# Patient Record
Sex: MALE | Race: WHITE | HISPANIC OR LATINO | URBAN - METROPOLITAN AREA
[De-identification: names, ages, dates, MRNs, and addresses within clinical notes are randomized per-mention and may not be internally consistent; named-entity substitution may affect disease eponyms.]

---

## 2020-03-30 ENCOUNTER — HOSPITAL ENCOUNTER (EMERGENCY)
Facility: MEDICAL CENTER | Age: 20
End: 2020-03-31
Attending: EMERGENCY MEDICINE

## 2020-03-30 DIAGNOSIS — F41.9 ANXIETY: ICD-10-CM

## 2020-03-30 PROCEDURE — 99283 EMERGENCY DEPT VISIT LOW MDM: CPT

## 2020-03-30 RX ORDER — HYDROXYZINE HYDROCHLORIDE 25 MG/1
25 TABLET, FILM COATED ORAL ONCE
Status: COMPLETED | OUTPATIENT
Start: 2020-03-31 | End: 2020-03-31

## 2020-03-31 VITALS
RESPIRATION RATE: 15 BRPM | OXYGEN SATURATION: 100 % | HEIGHT: 72 IN | TEMPERATURE: 98.4 F | BODY MASS INDEX: 25.08 KG/M2 | DIASTOLIC BLOOD PRESSURE: 76 MMHG | WEIGHT: 185.19 LBS | HEART RATE: 75 BPM | SYSTOLIC BLOOD PRESSURE: 134 MMHG

## 2020-03-31 PROCEDURE — 700102 HCHG RX REV CODE 250 W/ 637 OVERRIDE(OP): Performed by: EMERGENCY MEDICINE

## 2020-03-31 PROCEDURE — A9270 NON-COVERED ITEM OR SERVICE: HCPCS | Performed by: EMERGENCY MEDICINE

## 2020-03-31 RX ADMIN — HYDROXYZINE HYDROCHLORIDE 25 MG: 25 TABLET, FILM COATED ORAL at 00:02

## 2020-03-31 ASSESSMENT — ENCOUNTER SYMPTOMS
BLURRED VISION: 0
FEVER: 0
HEADACHES: 0
NAUSEA: 1
PALPITATIONS: 0
TINGLING: 1
VOMITING: 0
DOUBLE VISION: 0
DIARRHEA: 0
ABDOMINAL PAIN: 0
CHILLS: 0
NERVOUS/ANXIOUS: 1
DIZZINESS: 0

## 2020-03-31 NOTE — ED TRIAGE NOTES
"Chief Complaint   Patient presents with   • Tremors     Abnormal sensation after exercise, \"shaky\" no previous occurance prior to this   • Nausea     "

## 2020-03-31 NOTE — ED PROVIDER NOTES
"ED Provider Note    CHIEF COMPLAINT  Chief Complaint   Patient presents with   • Tremors     Abnormal sensation after exercise, \"shaky\" no previous occurance prior to this   • Nausea       HPI  Malachi Reyes is a 20 y.o. male with a sensation of \"shakiness.\"  Patient reports that he began working out around 8 PM this evening and finished around 10 PM.  Shortly after finishing working out he felt very shaky and anxious.  He stated that he did not feel like he could sit down because he was so shaky.  He reports that while this was happening he was breathing hard and feeling numbness in his face and fingertips.  He also has associated nausea.  Patient reports that he is feeling slightly better than he did initially at the time of my evaluation.  He is concerned about his \"shakiness.\"  Patient reports that immediately prior to working out he did not take any supplements, but he did drink coffee.  He ate dinner as usual as well.  Patient states that he was drinking water during the workout, and does not feel that he is dehydrated.  He does note that he has had recent stressors including breaking up with his girlfriend.    Patient also reports that he has a history of frequent marijuana use, last use was 2 days ago.    REVIEW OF SYSTEMS  Review of Systems   Constitutional: Negative for chills and fever.   Eyes: Negative for blurred vision and double vision.   Cardiovascular: Negative for chest pain and palpitations.   Gastrointestinal: Positive for nausea. Negative for abdominal pain, diarrhea and vomiting.   Neurological: Positive for tingling. Negative for dizziness and headaches.   Psychiatric/Behavioral: The patient is nervous/anxious.    All other systems reviewed and are negative.      PAST MEDICAL HISTORY       SOCIAL HISTORY  Social History     Tobacco Use   • Smoking status: Not on file   Substance and Sexual Activity   • Alcohol use: Not on file   • Drug use: Not on file   • Sexual activity: Not on file "       SURGICAL HISTORY  patient denies any surgical history    CURRENT MEDICATIONS  Home Medications     Reviewed by Rogelio Vincent R.N. (Registered Nurse) on 03/30/20 at 2331  Med List Status: Partial   Medication Last Dose Status        Patient Todd Taking any Medications                       ALLERGIES  No Known Allergies    PHYSICAL EXAM  VITAL SIGNS: /99   Pulse 79   Temp 36.9 °C (98.4 °F) (Oral)   Resp 16   Ht 1.829 m (6')   Wt 84 kg (185 lb 3 oz)   SpO2 100%   BMI 25.12 kg/m²  @ADELIA[[id number]::@  Pulse ox interpretation: I interpret this pulse ox as normal.    Physical Exam   Constitutional: He is oriented to person, place, and time. He appears distressed.   Appears anxious   HENT:   Head: Normocephalic and atraumatic.   Mouth/Throat: Mucous membranes are dry.   Eyes: Pupils are equal, round, and reactive to light. Conjunctivae are normal.   Neck: Normal range of motion. Neck supple.   Cardiovascular: Normal rate, regular rhythm and intact distal pulses.   Pulmonary/Chest: Effort normal and breath sounds normal. No respiratory distress. He has no rales.   Abdominal: Soft. Bowel sounds are normal. He exhibits no distension. There is no abdominal tenderness.   Musculoskeletal: Normal range of motion.         General: No deformity or edema.   Neurological: He is alert and oriented to person, place, and time. GCS score is 15.   Skin: Skin is warm.   Psychiatric: Judgment normal. His mood appears anxious.   Nursing note and vitals reviewed.          COURSE & MEDICAL DECISION MAKING  Pertinent Labs & Imaging studies reviewed. (See chart for details)  20-year-old male presents emergency department for evaluation of shakiness and nausea.  On examination, the patient was well-appearing with normal vital signs aside from hypertension for age.  Patient's presentation is most consistent with anxiety and a panic attack.  Feel that all of his symptoms are secondary to this.  Patient also drink coffee immediately  "before working out and felt the shakiness right after.  Feel that his anxiety is likely exacerbated by the caffeine.    I had a long discussion with the patient regarding anxiety, side effects of caffeine, as well as marijuana use.  Patient does have multiple recent stressors, but denies any depression, suicidality, or homicidality.  He states that he \"worries a lot\" and feels that marijuana really helps him relax.  He admits that he has not used any marijuana for 2 days, and feels that this may be the cause of his shakiness today.  Patient was given hydroxyzine with mild relief of his symptoms in the emergency department.  He continued to feel anxious, and I discussed with him treatment modalities for this.  I did offer benzodiazepines and patient declined.  I advised him to follow-up with her primary care doctor and consider referral to a psychiatrist or therapist for further evaluation of his anxiety.  Patient stated that he would prefer to obtain a medical marijuana card.  At this time, do not feel the patient has any life-threatening illness, and feel he is appropriate for discharge home.    The patient will return for new or worsening symptoms and is stable at the time of discharge.    The patient is referred to a primary physician for blood pressure management, diabetic screening, and for all other preventative health concerns.      DISPOSITION:  Patient will be discharged home in stable condition.    FOLLOW UP:  [facility], Emergency Dept  [address] [id number] [phone]    If symptoms worsen      OUTPATIENT MEDICATIONS:  New Prescriptions    No medications on file        FINAL IMPRESSION  Visit Diagnoses     ICD-10-CM   1. Anxiety F41.9              Electronically signed by: Ailyn Polk M.D., 3/30/2020 11:56 PM         "

## 2025-04-02 ENCOUNTER — NON-PROVIDER VISIT (OUTPATIENT)
Dept: URGENT CARE | Facility: CLINIC | Age: 25
End: 2025-04-02

## 2025-04-02 DIAGNOSIS — Z02.83 ENCOUNTER FOR DRUG SCREENING: ICD-10-CM

## 2025-04-02 DIAGNOSIS — Z02.1 PRE-EMPLOYMENT DRUG SCREENING: ICD-10-CM

## 2025-04-02 LAB
AMP AMPHETAMINE: NORMAL
COC COCAINE: NORMAL
INT CON NEG: NORMAL
INT CON POS: NORMAL
MET METHAMPHETAMINES: NORMAL
OPI OPIATES: NORMAL
PCP PHENCYCLIDINE: NORMAL
POC DRUG COMMENT 753798-OCCUPATIONAL HEALTH: NORMAL
THC: NORMAL

## 2025-04-02 PROCEDURE — 80305 DRUG TEST PRSMV DIR OPT OBS: CPT | Performed by: PHYSICIAN ASSISTANT
